# Patient Record
Sex: MALE | Race: BLACK OR AFRICAN AMERICAN | NOT HISPANIC OR LATINO | Employment: OTHER | URBAN - METROPOLITAN AREA
[De-identification: names, ages, dates, MRNs, and addresses within clinical notes are randomized per-mention and may not be internally consistent; named-entity substitution may affect disease eponyms.]

---

## 2022-07-26 ENCOUNTER — OFFICE VISIT (OUTPATIENT)
Dept: PODIATRY | Facility: CLINIC | Age: 72
End: 2022-07-26
Payer: MEDICARE

## 2022-07-26 VITALS
WEIGHT: 214 LBS | HEART RATE: 89 BPM | BODY MASS INDEX: 31.7 KG/M2 | SYSTOLIC BLOOD PRESSURE: 148 MMHG | TEMPERATURE: 98 F | DIASTOLIC BLOOD PRESSURE: 86 MMHG | HEIGHT: 69 IN

## 2022-07-26 DIAGNOSIS — L60.0 INGROWN NAIL: ICD-10-CM

## 2022-07-26 DIAGNOSIS — M20.41 HAMMER TOES OF BOTH FEET: ICD-10-CM

## 2022-07-26 DIAGNOSIS — M20.12 VALGUS DEFORMITY OF BOTH GREAT TOES: ICD-10-CM

## 2022-07-26 DIAGNOSIS — E11.9 TYPE 2 DIABETES MELLITUS WITHOUT COMPLICATION, WITHOUT LONG-TERM CURRENT USE OF INSULIN: Primary | ICD-10-CM

## 2022-07-26 DIAGNOSIS — E11.9 COMPREHENSIVE DIABETIC FOOT EXAMINATION, TYPE 2 DM, ENCOUNTER FOR: ICD-10-CM

## 2022-07-26 DIAGNOSIS — M20.42 HAMMER TOES OF BOTH FEET: ICD-10-CM

## 2022-07-26 DIAGNOSIS — M20.11 VALGUS DEFORMITY OF BOTH GREAT TOES: ICD-10-CM

## 2022-07-26 PROCEDURE — 99999 PR PBB SHADOW E&M-NEW PATIENT-LVL IV: ICD-10-PCS | Mod: PBBFAC,,, | Performed by: PODIATRIST

## 2022-07-26 PROCEDURE — 99203 OFFICE O/P NEW LOW 30 MIN: CPT | Mod: S$PBB,,, | Performed by: PODIATRIST

## 2022-07-26 PROCEDURE — 99204 OFFICE O/P NEW MOD 45 MIN: CPT | Mod: PBBFAC,PN | Performed by: PODIATRIST

## 2022-07-26 PROCEDURE — 99203 PR OFFICE/OUTPT VISIT, NEW, LEVL III, 30-44 MIN: ICD-10-PCS | Mod: S$PBB,,, | Performed by: PODIATRIST

## 2022-07-26 PROCEDURE — 99999 PR PBB SHADOW E&M-NEW PATIENT-LVL IV: CPT | Mod: PBBFAC,,, | Performed by: PODIATRIST

## 2022-07-26 RX ORDER — METFORMIN HYDROCHLORIDE 500 MG/1
500 TABLET ORAL 2 TIMES DAILY WITH MEALS
COMMUNITY

## 2022-07-26 RX ORDER — OXYBUTYNIN CHLORIDE 5 MG/1
5 TABLET ORAL 3 TIMES DAILY
COMMUNITY

## 2022-07-26 RX ORDER — LISINOPRIL 10 MG/1
10 TABLET ORAL DAILY
COMMUNITY

## 2022-07-26 RX ORDER — ASPIRIN 81 MG/1
81 TABLET ORAL DAILY
COMMUNITY

## 2022-07-26 RX ORDER — AMLODIPINE BESYLATE 10 MG/1
10 TABLET ORAL DAILY
COMMUNITY

## 2022-07-26 RX ORDER — PANTOPRAZOLE SODIUM 20 MG/1
20 TABLET, DELAYED RELEASE ORAL DAILY
COMMUNITY

## 2022-07-26 RX ORDER — LEVETIRACETAM 750 MG/1
500 TABLET ORAL 2 TIMES DAILY
COMMUNITY

## 2022-07-26 RX ORDER — LEVETIRACETAM 500 MG/1
500 TABLET ORAL 2 TIMES DAILY
COMMUNITY
End: 2023-09-06

## 2022-07-26 RX ORDER — FERROUS SULFATE 325(65) MG
325 TABLET ORAL
COMMUNITY

## 2022-07-26 RX ORDER — TRAZODONE HYDROCHLORIDE 50 MG/1
50 TABLET ORAL NIGHTLY
COMMUNITY
End: 2023-01-29

## 2022-07-26 RX ORDER — POLYETHYLENE GLYCOL 3350 17 G/17G
POWDER, FOR SOLUTION ORAL
COMMUNITY

## 2022-07-30 PROBLEM — M20.41 HAMMER TOES OF BOTH FEET: Status: ACTIVE | Noted: 2022-07-30

## 2022-07-30 PROBLEM — E11.9 COMPREHENSIVE DIABETIC FOOT EXAMINATION, TYPE 2 DM, ENCOUNTER FOR: Status: ACTIVE | Noted: 2022-07-30

## 2022-07-30 PROBLEM — E11.9 TYPE 2 DIABETES MELLITUS WITHOUT COMPLICATION, WITHOUT LONG-TERM CURRENT USE OF INSULIN: Status: ACTIVE | Noted: 2022-07-30

## 2022-07-30 PROBLEM — L60.0 INGROWN NAIL: Status: ACTIVE | Noted: 2022-07-30

## 2022-07-30 PROBLEM — M20.11 VALGUS DEFORMITY OF BOTH GREAT TOES: Status: ACTIVE | Noted: 2022-07-30

## 2022-07-30 PROBLEM — M20.42 HAMMER TOES OF BOTH FEET: Status: ACTIVE | Noted: 2022-07-30

## 2022-07-30 PROBLEM — M20.12 VALGUS DEFORMITY OF BOTH GREAT TOES: Status: ACTIVE | Noted: 2022-07-30

## 2022-07-30 NOTE — PROGRESS NOTES
Subjective:       Patient ID: Gerald Tirado is a 71 y.o. male.    Chief Complaint: Nail Problem and Diabetes Mellitus    Patient presents today for a new patient diabetic evaluation he is a resident of [facility].  Patient states he does have pain related to his bunions and he also has ingrown toenails he relates that he was diagnosed as a diabetic 2 years ago.  Past Medical History:   Diagnosis Date    Antisocial personality disorder     Cerebrovascular disease     Complete loss of teeth     Constipation     Convulsions     Dementia     Diabetes mellitus, type 2     Falling     GERD (gastroesophageal reflux disease)     Gingival recession, generalized     Hypertension     Insomnia     Overactive bladder     Schizophrenia      History reviewed. No pertinent surgical history.  History reviewed. No pertinent family history.  Social History     Socioeconomic History    Marital status: Single   Tobacco Use    Smoking status: Never Smoker    Smokeless tobacco: Never Used       Current Outpatient Medications   Medication Sig Dispense Refill    amLODIPine (NORVASC) 10 MG tablet Take 10 mg by mouth once daily.      aspirin (ECOTRIN) 81 MG EC tablet Take 81 mg by mouth once daily.      ferrous sulfate (FEOSOL) 325 mg (65 mg iron) Tab tablet Take 325 mg by mouth daily with breakfast.      levETIRAcetam (KEPPRA) 500 MG Tab Take 500 mg by mouth 2 (two) times daily.      levETIRAcetam (KEPPRA) 750 MG Tab Take 500 mg by mouth 2 (two) times daily.      lisinopriL 10 MG tablet Take 10 mg by mouth once daily.      metFORMIN (GLUCOPHAGE) 500 MG tablet Take 500 mg by mouth 2 (two) times daily with meals.      oxybutynin (DITROPAN) 5 MG Tab Take 5 mg by mouth 3 (three) times daily.      pantoprazole (PROTONIX) 20 MG tablet Take 20 mg by mouth once daily.      polyethylene glycol (GLYCOLAX) 17 gram PwPk Take by mouth.      traZODone (DESYREL) 50 MG tablet Take 50 mg by mouth every evening.   "     No current facility-administered medications for this visit.     Review of patient's allergies indicates:   Allergen Reactions    Baclofen        Review of Systems   Musculoskeletal: Positive for arthralgias.   Skin: Positive for color change.   Neurological: Positive for numbness.   All other systems reviewed and are negative.      Objective:      Vitals:    07/26/22 1549   BP: (!) 148/86   Pulse: 89   Temp: 97.9 °F (36.6 °C)   Weight: 97.1 kg (214 lb)   Height: 5' 9" (1.753 m)     Physical Exam  Vitals and nursing note reviewed. Exam conducted with a chaperone present.   Constitutional:       Appearance: Normal appearance.   Cardiovascular:      Pulses:           Dorsalis pedis pulses are 1+ on the right side and 1+ on the left side.        Posterior tibial pulses are 1+ on the right side and 1+ on the left side.   Pulmonary:      Effort: Pulmonary effort is normal.   Musculoskeletal:         General: Swelling, tenderness and deformity present.      Right foot: Decreased range of motion. Deformity and bunion present.      Left foot: Decreased range of motion. Deformity and bunion present.   Feet:      Right foot:      Protective Sensation: 3 sites tested. 2 sites sensed.      Skin integrity: Erythema, callus and dry skin present.      Toenail Condition: Right toenails are abnormally thick, long and ingrown. Fungal disease present.     Left foot:      Protective Sensation: 3 sites tested. 2 sites sensed.      Skin integrity: Erythema, callus and dry skin present.      Toenail Condition: Left toenails are abnormally thick, long and ingrown. Fungal disease present.  Skin:     General: Skin is warm.      Capillary Refill: Capillary refill takes 2 to 3 seconds.      Findings: Erythema present.   Neurological:      General: No focal deficit present.      Mental Status: He is alert.   Psychiatric:         Mood and Affect: Mood normal.                              Assessment:       1. Type 2 diabetes mellitus " without complication, without long-term current use of insulin    2. Comprehensive diabetic foot examination, type 2 DM, encounter for    3. Hammer toes of both feet    4. Valgus deformity of both great toes    5. Ingrown nail        Plan:       Patient presents today for a new patient diabetic evaluation he is a resident of [facility].  Patient states he does have pain related to his bunions and he also has ingrown toenails he relates that he was diagnosed as a diabetic 2 years ago.  Patient presented today with a chaperone from the nursing home.  A comprehensive new patient diabetic evaluation was performed patient does have some degree of loss of sensation although protective sensation is noted to be intact when tested with a 5.07 monofilament.  Patient does have significant bunion and hammertoe deformities bilateral he does have pain related to these deformities and states he has to be very careful of the shoes that he wears.  Patient has significantly fungally infected nail several are ingrown and pinching the surrounding skin causing inflammation and early signs of ingrowing toenail.  Patient has no skin breaks no ulceration or signs of infection but there is inflammation related to ingrowing toenail I was able to trim and remove the ingrowing toenails patient did not require a nail avulsion at this time.  Comprehensive diabetic evaluation was performed today I advised the patient he needs to monitor his feet closely keep them well hydrated and he needs to keep the nails properly trimmed to prevent complications in the future.  Diabetic education was provided.  This note was created using [employer] voice recognition software that occasionally misinterpreted phrases or words.

## 2023-01-26 ENCOUNTER — OFFICE VISIT (OUTPATIENT)
Dept: PODIATRY | Facility: CLINIC | Age: 73
End: 2023-01-26
Payer: MEDICARE

## 2023-01-26 VITALS
DIASTOLIC BLOOD PRESSURE: 79 MMHG | SYSTOLIC BLOOD PRESSURE: 137 MMHG | WEIGHT: 214 LBS | BODY MASS INDEX: 31.7 KG/M2 | HEART RATE: 101 BPM | HEIGHT: 69 IN

## 2023-01-26 DIAGNOSIS — M20.41 HAMMER TOES OF BOTH FEET: ICD-10-CM

## 2023-01-26 DIAGNOSIS — M20.12 VALGUS DEFORMITY OF BOTH GREAT TOES: ICD-10-CM

## 2023-01-26 DIAGNOSIS — M20.11 VALGUS DEFORMITY OF BOTH GREAT TOES: ICD-10-CM

## 2023-01-26 DIAGNOSIS — L60.0 INGROWN NAIL: Primary | ICD-10-CM

## 2023-01-26 DIAGNOSIS — M20.42 HAMMER TOES OF BOTH FEET: ICD-10-CM

## 2023-01-26 DIAGNOSIS — E11.9 COMPREHENSIVE DIABETIC FOOT EXAMINATION, TYPE 2 DM, ENCOUNTER FOR: ICD-10-CM

## 2023-01-26 DIAGNOSIS — E11.9 TYPE 2 DIABETES MELLITUS WITHOUT COMPLICATION, WITHOUT LONG-TERM CURRENT USE OF INSULIN: ICD-10-CM

## 2023-01-26 PROCEDURE — 99214 OFFICE O/P EST MOD 30 MIN: CPT | Mod: PBBFAC,PN | Performed by: PODIATRIST

## 2023-01-26 PROCEDURE — 99213 PR OFFICE/OUTPT VISIT, EST, LEVL III, 20-29 MIN: ICD-10-PCS | Mod: S$PBB,,, | Performed by: PODIATRIST

## 2023-01-26 PROCEDURE — 99213 OFFICE O/P EST LOW 20 MIN: CPT | Mod: S$PBB,,, | Performed by: PODIATRIST

## 2023-01-26 PROCEDURE — 99999 PR PBB SHADOW E&M-EST. PATIENT-LVL IV: ICD-10-PCS | Mod: PBBFAC,,, | Performed by: PODIATRIST

## 2023-01-26 PROCEDURE — 99999 PR PBB SHADOW E&M-EST. PATIENT-LVL IV: CPT | Mod: PBBFAC,,, | Performed by: PODIATRIST

## 2023-01-26 RX ORDER — LYSINE HCL 500 MG
1 TABLET ORAL 2 TIMES DAILY
COMMUNITY

## 2023-01-26 RX ORDER — POTASSIUM CHLORIDE 750 MG/1
10 CAPSULE, EXTENDED RELEASE ORAL ONCE
COMMUNITY
End: 2023-01-29

## 2023-01-26 RX ORDER — ATORVASTATIN CALCIUM 20 MG/1
20 TABLET, FILM COATED ORAL DAILY
COMMUNITY

## 2023-01-26 RX ORDER — FUROSEMIDE 20 MG/1
20 TABLET ORAL DAILY
COMMUNITY

## 2023-01-26 RX ORDER — DOCUSATE SODIUM 100 MG/1
100 CAPSULE, LIQUID FILLED ORAL 2 TIMES DAILY
COMMUNITY

## 2023-01-26 RX ORDER — CYCLOBENZAPRINE HCL 10 MG
10 TABLET ORAL 2 TIMES DAILY
COMMUNITY

## 2023-01-29 NOTE — PROGRESS NOTES
Subjective:       Patient ID: Gerald Tirado is a 72 y.o. male.    Chief Complaint: Diabetes Mellitus, Nail Problem, Nail Care, Diabetic Foot Exam, and Follow-up    Patient presents today for a follow-up patient diabetic evaluation he is a resident of [facility].  Patient states he does have pain related to his bunions and he also has ingrown toenails he relates that he was diagnosed as a diabetic 2 years ago.  Past Medical History:   Diagnosis Date    Antisocial personality disorder     Cataract     Cerebrovascular disease     Complete loss of teeth     Constipation     Convulsions     Dementia     Diabetes mellitus, type 2     Falling     GERD (gastroesophageal reflux disease)     Gingival recession, generalized     Hyperlipidemia     Hypertension     Insomnia     Myopia, bilateral     Overactive bladder     Presbyopia     Schizophrenia     Unspecified astigmatism, unspecified eye      No past surgical history on file.  No family history on file.  Social History     Socioeconomic History    Marital status: Single   Tobacco Use    Smoking status: Never    Smokeless tobacco: Never       Current Outpatient Medications   Medication Sig Dispense Refill    amLODIPine (NORVASC) 10 MG tablet Take 10 mg by mouth once daily.      aspirin (ECOTRIN) 81 MG EC tablet Take 81 mg by mouth once daily.      atorvastatin (LIPITOR) 20 MG tablet Take 20 mg by mouth once daily.      calcium carbonate-vit D3-min 600 mg calcium- 400 unit Tab Take 1 tablet by mouth 2 (two) times daily.      cyclobenzaprine (FLEXERIL) 10 MG tablet Take 10 mg by mouth 2 (two) times a day.      docusate sodium (COLACE) 100 MG capsule Take 100 mg by mouth 2 (two) times daily.      ferrous sulfate (FEOSOL) 325 mg (65 mg iron) Tab tablet Take 325 mg by mouth daily with breakfast.      furosemide (LASIX) 20 MG tablet Take 20 mg by mouth once daily.      insulin detemir U-100 (LEVEMIR) 100 unit/mL injection Inject 15 Units into the skin every  "evening.      levETIRAcetam (KEPPRA) 750 MG Tab Take 500 mg by mouth 2 (two) times daily.      lisinopriL 10 MG tablet Take 10 mg by mouth once daily.      metFORMIN (GLUCOPHAGE) 500 MG tablet Take 500 mg by mouth 2 (two) times daily with meals.      oxybutynin (DITROPAN) 5 MG Tab Take 5 mg by mouth 3 (three) times daily.      pantoprazole (PROTONIX) 20 MG tablet Take 20 mg by mouth once daily.      polyethylene glycol (GLYCOLAX) 17 gram PwPk Take by mouth.      levETIRAcetam (KEPPRA) 500 MG Tab Take 500 mg by mouth 2 (two) times daily.       No current facility-administered medications for this visit.     Review of patient's allergies indicates:   Allergen Reactions    Baclofen        Review of Systems   Musculoskeletal:  Positive for arthralgias.   Skin:  Positive for color change.   Neurological:  Positive for numbness.   All other systems reviewed and are negative.    Objective:      Vitals:    01/26/23 1408   BP: 137/79   Pulse: 101   Weight: 97.1 kg (214 lb)   Height: 5' 9" (1.753 m)     Physical Exam  Vitals and nursing note reviewed. Exam conducted with a chaperone present.   Constitutional:       Appearance: Normal appearance.   Cardiovascular:      Pulses:           Dorsalis pedis pulses are 1+ on the right side and 1+ on the left side.        Posterior tibial pulses are 1+ on the right side and 1+ on the left side.   Pulmonary:      Effort: Pulmonary effort is normal.   Musculoskeletal:         General: Swelling, tenderness and deformity present.      Right foot: Decreased range of motion. Deformity and bunion present.      Left foot: Decreased range of motion. Deformity and bunion present.   Feet:      Right foot:      Protective Sensation: 3 sites tested.  2 sites sensed.      Skin integrity: Erythema, callus and dry skin present.      Toenail Condition: Right toenails are abnormally thick, long and ingrown. Fungal disease present.     Left foot:      Protective Sensation: 3 sites tested.  2 sites " sensed.      Skin integrity: Erythema, callus and dry skin present.      Toenail Condition: Left toenails are abnormally thick, long and ingrown. Fungal disease present.  Skin:     General: Skin is warm.      Capillary Refill: Capillary refill takes 2 to 3 seconds.      Findings: Erythema present.   Neurological:      General: No focal deficit present.      Mental Status: He is alert.   Psychiatric:         Mood and Affect: Mood normal.                                          Assessment:       1. Ingrown nail    2. Type 2 diabetes mellitus without complication, without long-term current use of insulin    3. Comprehensive diabetic foot examination, type 2 DM, encounter for    4. Valgus deformity of both great toes    5. Hammer toes of both feet        Plan:       Patient presents today for a follow-up patient diabetic evaluation he is a resident of [facility].  Patient states he does have pain related to his bunions and he also has ingrown toenails he relates that he was diagnosed as a diabetic 2 years ago.  Patient presented today with a chaperone from the nursing home.  A comprehensive new patient diabetic evaluation was performed patient does have some degree of loss of sensation although protective sensation is noted to be intact when tested with a 5.07 monofilament.  Patient does have significant bunion and hammertoe deformities bilateral he does have pain related to these deformities and states he has to be very careful of the shoes that he wears.  Patient has significantly fungally infected nail several are ingrown and pinching the surrounding skin causing inflammation and early signs of ingrowing toenail.  Patient has no skin breaks no ulceration or signs of infection but there is inflammation related to ingrowing toenail I was able to trim and remove the ingrowing toenails patient did not require a nail avulsion at this time.  Comprehensive diabetic evaluation was performed today I  advised the patient he needs to monitor his feet closely keep them well hydrated and he needs to keep the nails properly trimmed to prevent complications in the future.  Diabetic education was provided.  This note was created using [employer] voice recognition software that occasionally misinterpreted phrases or words.

## 2023-05-16 ENCOUNTER — OFFICE VISIT (OUTPATIENT)
Dept: PODIATRY | Facility: CLINIC | Age: 73
End: 2023-05-16
Payer: MEDICARE

## 2023-05-16 VITALS
HEART RATE: 80 BPM | WEIGHT: 221.19 LBS | HEIGHT: 69 IN | BODY MASS INDEX: 32.76 KG/M2 | SYSTOLIC BLOOD PRESSURE: 145 MMHG | DIASTOLIC BLOOD PRESSURE: 73 MMHG

## 2023-05-16 DIAGNOSIS — M20.41 HAMMER TOES OF BOTH FEET: ICD-10-CM

## 2023-05-16 DIAGNOSIS — E11.9 TYPE 2 DIABETES MELLITUS WITHOUT COMPLICATION, WITHOUT LONG-TERM CURRENT USE OF INSULIN: ICD-10-CM

## 2023-05-16 DIAGNOSIS — L60.0 INGROWN NAIL: Primary | ICD-10-CM

## 2023-05-16 DIAGNOSIS — M20.42 HAMMER TOES OF BOTH FEET: ICD-10-CM

## 2023-05-16 DIAGNOSIS — B35.3 TINEA PEDIS OF BOTH FEET: ICD-10-CM

## 2023-05-16 DIAGNOSIS — E11.9 COMPREHENSIVE DIABETIC FOOT EXAMINATION, TYPE 2 DM, ENCOUNTER FOR: ICD-10-CM

## 2023-05-16 PROCEDURE — 99213 OFFICE O/P EST LOW 20 MIN: CPT | Mod: S$PBB,,, | Performed by: PODIATRIST

## 2023-05-16 PROCEDURE — 99215 OFFICE O/P EST HI 40 MIN: CPT | Mod: PBBFAC,PN | Performed by: PODIATRIST

## 2023-05-16 PROCEDURE — 99213 PR OFFICE/OUTPT VISIT, EST, LEVL III, 20-29 MIN: ICD-10-PCS | Mod: S$PBB,,, | Performed by: PODIATRIST

## 2023-05-16 PROCEDURE — 99999 PR PBB SHADOW E&M-EST. PATIENT-LVL V: CPT | Mod: PBBFAC,,, | Performed by: PODIATRIST

## 2023-05-16 PROCEDURE — 99999 PR PBB SHADOW E&M-EST. PATIENT-LVL V: ICD-10-PCS | Mod: PBBFAC,,, | Performed by: PODIATRIST

## 2023-05-16 RX ORDER — ACETAMINOPHEN 325 MG/1
325 TABLET ORAL EVERY 6 HOURS PRN
COMMUNITY

## 2023-05-16 RX ORDER — DICLOFENAC SODIUM 10 MG/G
GEL TOPICAL
COMMUNITY
Start: 2022-11-18

## 2023-05-16 RX ORDER — BACITRACIN ZINC AND POLYMYXIN B SULFATE 500; 10000 [USP'U]/G; [USP'U]/G
OINTMENT OPHTHALMIC
COMMUNITY
Start: 2023-05-11

## 2023-05-16 RX ORDER — POTASSIUM CHLORIDE 750 MG/1
CAPSULE, EXTENDED RELEASE ORAL 2 TIMES DAILY
COMMUNITY
Start: 2023-04-28

## 2023-05-16 NOTE — PROGRESS NOTES
Subjective:       Patient ID: Gerald Tirado is a 72 y.o. male.    Chief Complaint: Ingrown Toenail    Patient presents today for a follow-up patient diabetic evaluation he is a resident of [facility].  Patient states he does have pain related to his bunions and he also has ingrown toenails he relates that he was diagnosed as a diabetic 2 years ago.  Past Medical History:   Diagnosis Date    Antisocial personality disorder     Cataract     Cerebrovascular disease     Complete loss of teeth     Constipation     Convulsions     Dementia     Diabetes mellitus, type 2     Falling     GERD (gastroesophageal reflux disease)     Gingival recession, generalized     Hyperlipidemia     Hypertension     Insomnia     Myopia, bilateral     Overactive bladder     Presbyopia     Schizophrenia     Unspecified astigmatism, unspecified eye      History reviewed. No pertinent surgical history.  History reviewed. No pertinent family history.  Social History     Socioeconomic History    Marital status: Single   Tobacco Use    Smoking status: Never    Smokeless tobacco: Never       Current Outpatient Medications   Medication Sig Dispense Refill    acetaminophen (TYLENOL) 325 MG tablet Take 325 mg by mouth every 6 (six) hours as needed for Pain.      amLODIPine (NORVASC) 10 MG tablet Take 10 mg by mouth once daily.      aspirin (ECOTRIN) 81 MG EC tablet Take 81 mg by mouth once daily.      atorvastatin (LIPITOR) 20 MG tablet Take 20 mg by mouth once daily.      bacitracin-neomycin-polymyxin b-hydrocortisone 1 % ointment Apply topically once daily.      calcium carbonate-vit D3-min 600 mg calcium- 400 unit Tab Take 1 tablet by mouth 2 (two) times daily.      cyclobenzaprine (FLEXERIL) 10 MG tablet Take 10 mg by mouth 2 (two) times a day.      docusate sodium (COLACE) 100 MG capsule Take 100 mg by mouth 2 (two) times daily.      ferrous sulfate (FEOSOL) 325 mg (65 mg iron) Tab tablet Take 325 mg by mouth daily with  "breakfast.      furosemide (LASIX) 20 MG tablet Take 20 mg by mouth once daily.      insulin detemir U-100 (LEVEMIR) 100 unit/mL injection Inject 15 Units into the skin every evening.      levETIRAcetam (KEPPRA) 750 MG Tab Take 500 mg by mouth 2 (two) times daily.      lisinopriL 10 MG tablet Take 10 mg by mouth once daily.      metFORMIN (GLUCOPHAGE) 500 MG tablet Take 500 mg by mouth 2 (two) times daily with meals.      oxybutynin (DITROPAN) 5 MG Tab Take 5 mg by mouth 3 (three) times daily.      pantoprazole (PROTONIX) 20 MG tablet Take 20 mg by mouth once daily.      polyethylene glycol (GLYCOLAX) 17 gram PwPk Take by mouth.      potassium chloride (MICRO-K) 10 MEQ CpSR Take by mouth 2 (two) times daily.      bacitracin-polymyxin b (POLYSPORIN) ophthalmic ointment       diclofenac sodium (VOLTAREN) 1 % Gel Apply topically.      levETIRAcetam (KEPPRA) 500 MG Tab Take 500 mg by mouth 2 (two) times daily.       No current facility-administered medications for this visit.     Review of patient's allergies indicates:   Allergen Reactions    Baclofen        Review of Systems   Musculoskeletal:  Positive for arthralgias.   Skin:  Positive for color change.   Neurological:  Positive for numbness.   All other systems reviewed and are negative.    Objective:      Vitals:    05/16/23 0956   BP: (!) 145/73   Pulse: 80   Weight: 100.3 kg (221 lb 3.2 oz)   Height: 5' 9" (1.753 m)     Physical Exam  Vitals and nursing note reviewed. Exam conducted with a chaperone present.   Constitutional:       Appearance: Normal appearance.   Cardiovascular:      Pulses:           Dorsalis pedis pulses are 1+ on the right side and 1+ on the left side.        Posterior tibial pulses are 1+ on the right side and 1+ on the left side.   Pulmonary:      Effort: Pulmonary effort is normal.   Musculoskeletal:         General: Swelling, tenderness and deformity present.      Right foot: Decreased range of motion. Deformity and bunion present.      " Left foot: Decreased range of motion. Deformity and bunion present.   Feet:      Right foot:      Protective Sensation: 3 sites tested.  2 sites sensed.      Skin integrity: Erythema, callus and dry skin present.      Toenail Condition: Right toenails are abnormally thick, long and ingrown. Fungal disease present.     Left foot:      Protective Sensation: 3 sites tested.  2 sites sensed.      Skin integrity: Erythema, callus and dry skin present.      Toenail Condition: Left toenails are abnormally thick, long and ingrown. Fungal disease present.  Skin:     General: Skin is warm.      Capillary Refill: Capillary refill takes 2 to 3 seconds.      Findings: Erythema present.   Neurological:      General: No focal deficit present.      Mental Status: He is alert.   Psychiatric:         Mood and Affect: Mood normal.                                                                                    Assessment:       1. Ingrown nail    2. Type 2 diabetes mellitus without complication, without long-term current use of insulin    3. Comprehensive diabetic foot examination, type 2 DM, encounter for    4. Hammer toes of both feet    5. Tinea pedis of both feet        Plan:       Patient presents today for a follow-up patient diabetic evaluation he is a resident of [facility].  Patient states he does have pain related to his bunions and he also has ingrown toenails he relates that he was diagnosed as a diabetic 2 years ago.  Patient presented today with a chaperone from the nursing home.  A comprehensive new patient diabetic evaluation was performed patient does have some degree of loss of sensation although protective sensation is noted to be intact when tested with a 5.07 monofilament.  Patient does have significant bunion and hammertoe deformities bilateral he does have pain related to these deformities and states he has to be very careful of the shoes that he wears.  Patient has significantly fungally  infected nail several are ingrown and pinching the surrounding skin causing inflammation and early signs of ingrowing toenail.  Patient has no skin breaks no ulceration or signs of infection but there is inflammation related to ingrowing toenail I was able to trim and remove the ingrowing toenails patient did not require a nail avulsion at this time.  Comprehensive diabetic evaluation was performed today I advised the patient he needs to monitor his feet closely keep them well hydrated and he needs to keep the nails properly trimmed to prevent complications in the future.  Diabetic education was provided.  Patient did have interdigital maceration Betadine was applied to all webspaces bilateral I also dispensed the patient various size Silipos toe spacers to place between the 1st and 2nd and 2nd and 3rd digits on the right foot where he is got a lot of rubbing and related discomfort.  This note was created using [employer] voice recognition software that occasionally misinterpreted phrases or words.

## 2023-07-10 ENCOUNTER — TELEPHONE (OUTPATIENT)
Dept: PODIATRY | Facility: CLINIC | Age: 73
End: 2023-07-10
Payer: MEDICARE

## 2023-09-05 ENCOUNTER — OFFICE VISIT (OUTPATIENT)
Dept: PODIATRY | Facility: CLINIC | Age: 73
End: 2023-09-05
Payer: MEDICARE

## 2023-09-05 VITALS
HEART RATE: 77 BPM | WEIGHT: 221 LBS | DIASTOLIC BLOOD PRESSURE: 68 MMHG | BODY MASS INDEX: 32.73 KG/M2 | HEIGHT: 69 IN | SYSTOLIC BLOOD PRESSURE: 143 MMHG

## 2023-09-05 DIAGNOSIS — E11.9 TYPE 2 DIABETES MELLITUS WITHOUT COMPLICATION, WITHOUT LONG-TERM CURRENT USE OF INSULIN: ICD-10-CM

## 2023-09-05 DIAGNOSIS — M20.41 HAMMER TOES OF BOTH FEET: ICD-10-CM

## 2023-09-05 DIAGNOSIS — M20.12 VALGUS DEFORMITY OF BOTH GREAT TOES: ICD-10-CM

## 2023-09-05 DIAGNOSIS — L60.0 INGROWN NAIL: Primary | ICD-10-CM

## 2023-09-05 DIAGNOSIS — M20.42 HAMMER TOES OF BOTH FEET: ICD-10-CM

## 2023-09-05 DIAGNOSIS — M20.11 VALGUS DEFORMITY OF BOTH GREAT TOES: ICD-10-CM

## 2023-09-05 DIAGNOSIS — E11.9 COMPREHENSIVE DIABETIC FOOT EXAMINATION, TYPE 2 DM, ENCOUNTER FOR: ICD-10-CM

## 2023-09-05 PROCEDURE — 99213 OFFICE O/P EST LOW 20 MIN: CPT | Mod: S$PBB,,, | Performed by: PODIATRIST

## 2023-09-05 PROCEDURE — 99215 OFFICE O/P EST HI 40 MIN: CPT | Mod: PBBFAC,PN | Performed by: PODIATRIST

## 2023-09-05 PROCEDURE — 99213 PR OFFICE/OUTPT VISIT, EST, LEVL III, 20-29 MIN: ICD-10-PCS | Mod: S$PBB,,, | Performed by: PODIATRIST

## 2023-09-05 PROCEDURE — 99999 PR PBB SHADOW E&M-EST. PATIENT-LVL V: CPT | Mod: PBBFAC,,, | Performed by: PODIATRIST

## 2023-09-05 PROCEDURE — 99999 PR PBB SHADOW E&M-EST. PATIENT-LVL V: ICD-10-PCS | Mod: PBBFAC,,, | Performed by: PODIATRIST

## 2023-09-06 NOTE — PROGRESS NOTES
Subjective:       Patient ID: Gerald Tirado is a 72 y.o. male.    Chief Complaint: Ingrown Toenail and Diabetic Foot Exam    Patient presents today for a follow-up patient diabetic evaluation he is a resident of [facility].  Patient states he does have pain related to his bunions and he also has ingrown toenails he relates that he was diagnosed as a diabetic 2 years ago.  Past Medical History:   Diagnosis Date    Antisocial personality disorder     Cataract     Cerebrovascular disease     Complete loss of teeth     Constipation     Convulsions     Dementia     Diabetes mellitus, type 2     Falling     GERD (gastroesophageal reflux disease)     Gingival recession, generalized     Hyperlipidemia     Hypertension     Insomnia     Myopia, bilateral     Overactive bladder     Presbyopia     Schizophrenia     Unspecified astigmatism, unspecified eye      History reviewed. No pertinent surgical history.  History reviewed. No pertinent family history.  Social History     Socioeconomic History    Marital status: Single   Tobacco Use    Smoking status: Never    Smokeless tobacco: Never       Current Outpatient Medications   Medication Sig Dispense Refill    acetaminophen (TYLENOL) 325 MG tablet Take 325 mg by mouth every 6 (six) hours as needed for Pain.      amLODIPine (NORVASC) 10 MG tablet Take 10 mg by mouth once daily.      aspirin (ECOTRIN) 81 MG EC tablet Take 81 mg by mouth once daily.      atorvastatin (LIPITOR) 20 MG tablet Take 20 mg by mouth once daily.      calcium carbonate-vit D3-min 600 mg calcium- 400 unit Tab Take 1 tablet by mouth 2 (two) times daily.      cyclobenzaprine (FLEXERIL) 10 MG tablet Take 10 mg by mouth 2 (two) times a day.      docusate sodium (COLACE) 100 MG capsule Take 100 mg by mouth 2 (two) times daily.      ferrous sulfate (FEOSOL) 325 mg (65 mg iron) Tab tablet Take 325 mg by mouth daily with breakfast.      furosemide (LASIX) 20 MG tablet Take 20 mg by mouth once  "daily.      insulin detemir U-100 (LEVEMIR) 100 unit/mL injection Inject 15 Units into the skin every evening.      levETIRAcetam (KEPPRA) 750 MG Tab Take 500 mg by mouth 2 (two) times daily.      lisinopriL 10 MG tablet Take 10 mg by mouth once daily.      metFORMIN (GLUCOPHAGE) 500 MG tablet Take 500 mg by mouth 2 (two) times daily with meals.      oxybutynin (DITROPAN) 5 MG Tab Take 5 mg by mouth 3 (three) times daily.      pantoprazole (PROTONIX) 20 MG tablet Take 20 mg by mouth once daily.      polyethylene glycol (GLYCOLAX) 17 gram PwPk Take by mouth.      potassium chloride (MICRO-K) 10 MEQ CpSR Take by mouth 2 (two) times daily.      bacitracin-neomycin-polymyxin b-hydrocortisone 1 % ointment Apply topically once daily.      bacitracin-polymyxin b (POLYSPORIN) ophthalmic ointment       diclofenac sodium (VOLTAREN) 1 % Gel Apply topically.       No current facility-administered medications for this visit.     Review of patient's allergies indicates:   Allergen Reactions    Baclofen        Review of Systems   Musculoskeletal:  Positive for arthralgias.   Skin:  Positive for color change.   Neurological:  Positive for numbness.   All other systems reviewed and are negative.      Objective:      Vitals:    09/05/23 1049   BP: (!) 143/68   BP Location: Right arm   Patient Position: Sitting   Pulse: 77   Weight: 100.2 kg (221 lb)   Height: 5' 9" (1.753 m)     Physical Exam  Vitals and nursing note reviewed. Exam conducted with a chaperone present.   Constitutional:       Appearance: Normal appearance.   Cardiovascular:      Pulses:           Dorsalis pedis pulses are 1+ on the right side and 1+ on the left side.        Posterior tibial pulses are 1+ on the right side and 1+ on the left side.   Pulmonary:      Effort: Pulmonary effort is normal.   Musculoskeletal:         General: Swelling, tenderness and deformity present.      Right foot: Decreased range of motion. Deformity and bunion present.      Left foot: " Decreased range of motion. Deformity and bunion present.   Feet:      Right foot:      Protective Sensation: 3 sites tested.  2 sites sensed.      Skin integrity: Erythema, callus and dry skin present.      Toenail Condition: Right toenails are abnormally thick, long and ingrown. Fungal disease present.     Left foot:      Protective Sensation: 3 sites tested.  2 sites sensed.      Skin integrity: Erythema, callus and dry skin present.      Toenail Condition: Left toenails are abnormally thick, long and ingrown. Fungal disease present.  Skin:     General: Skin is warm.      Capillary Refill: Capillary refill takes 2 to 3 seconds.      Findings: Erythema present.   Neurological:      General: No focal deficit present.      Mental Status: He is alert.   Psychiatric:         Mood and Affect: Mood normal.                                  Assessment:       1. Ingrown nail    2. Type 2 diabetes mellitus without complication, without long-term current use of insulin    3. Comprehensive diabetic foot examination, type 2 DM, encounter for    4. Hammer toes of both feet    5. Valgus deformity of both great toes        Plan:       Patient presents today for a follow-up patient diabetic evaluation he is a resident of [facility].  Patient states he does have pain related to his bunions and he also has ingrown toenails he relates that he was diagnosed as a diabetic 2 years ago.  Patient presented today with a chaperone from the nursing home.  A comprehensive new patient diabetic evaluation was performed patient does have some degree of loss of sensation although protective sensation is noted to be intact when tested with a 5.07 monofilament.  Patient does have significant bunion and hammertoe deformities bilateral he does have pain related to these deformities and states he has to be very careful of the shoes that he wears.  Patient has significantly fungally infected nail several are ingrown and pinching the  surrounding skin causing inflammation and early signs of ingrowing toenail.  Patient has no skin breaks no ulceration or signs of infection but there is inflammation related to ingrowing toenail I was able to trim and remove the ingrowing toenails patient did not require a nail avulsion at this time.  Comprehensive diabetic evaluation was performed today I advised the patient he needs to monitor his feet closely keep them well hydrated and he needs to keep the nails properly trimmed to prevent complications in the future.  Diabetic education was provided.  Patient did have interdigital maceration Betadine was applied to all webspaces bilateral I also dispensed the patient various size Silipos toe spacers to place between the 1st and 2nd and 2nd and 3rd digits on the right foot where he is got a lot of rubbing and related discomfort.  Patient's athlete's foot fungal infection interdigital does look better today than it did previously although it is still needs to be monitored closely.  This note was created using M*Modal voice recognition software that occasionally misinterpreted phrases or words.    Additional paperwork requiring completion was executed for the patient's diabetic shoes patient does meet criteria for diabetic shoes with decreased circulation significant hammertoe and bunion deformity with diffuse callus and pre ulcerative sites noted.

## 2023-09-13 ENCOUNTER — TELEPHONE (OUTPATIENT)
Dept: PODIATRY | Facility: CLINIC | Age: 73
End: 2023-09-13
Payer: MEDICARE

## 2023-09-13 DIAGNOSIS — M20.12 VALGUS DEFORMITY OF BOTH GREAT TOES: ICD-10-CM

## 2023-09-13 DIAGNOSIS — M20.11 VALGUS DEFORMITY OF BOTH GREAT TOES: ICD-10-CM

## 2023-09-13 DIAGNOSIS — E11.9 COMPREHENSIVE DIABETIC FOOT EXAMINATION, TYPE 2 DM, ENCOUNTER FOR: ICD-10-CM

## 2023-09-13 DIAGNOSIS — E11.9 TYPE 2 DIABETES MELLITUS WITHOUT COMPLICATION, WITHOUT LONG-TERM CURRENT USE OF INSULIN: Primary | ICD-10-CM

## 2023-09-13 NOTE — TELEPHONE ENCOUNTER
[facility] requesting diabetic shoe prescription for patient. Please advise. Patient seen 9/5.

## 2023-09-19 ENCOUNTER — TELEPHONE (OUTPATIENT)
Dept: PODIATRY | Facility: CLINIC | Age: 73
End: 2023-09-19
Payer: MEDICARE

## 2023-09-19 NOTE — TELEPHONE ENCOUNTER
Nilsa at [facility] will be faxing some paperwork that medicare is requiring for patient's diabetic shoes.

## 2023-09-19 NOTE — TELEPHONE ENCOUNTER
----- Message from Tere Sen sent at 9/19/2023 11:01 AM CDT -----  Type: Needs Medical Advice  Who Called:  [facility]ils[facility] lyman/ [facility]    Best Call Back Number: [phone] ext [phone]  Additional Information: Regarding further requirements for [employer] limb and brace for pt's diabetic shoes  Please advise  Thank you

## 2024-01-23 ENCOUNTER — OFFICE VISIT (OUTPATIENT)
Dept: PODIATRY | Facility: CLINIC | Age: 74
End: 2024-01-23
Payer: MEDICARE

## 2024-01-23 VITALS
OXYGEN SATURATION: 96 % | BODY MASS INDEX: 32.73 KG/M2 | SYSTOLIC BLOOD PRESSURE: 167 MMHG | DIASTOLIC BLOOD PRESSURE: 75 MMHG | WEIGHT: 221 LBS | HEIGHT: 69 IN | HEART RATE: 75 BPM

## 2024-01-23 DIAGNOSIS — L60.0 INGROWN NAIL: Primary | ICD-10-CM

## 2024-01-23 DIAGNOSIS — L97.511 ULCER OF BOTH FEET, LIMITED TO BREAKDOWN OF SKIN: ICD-10-CM

## 2024-01-23 DIAGNOSIS — L97.521 ULCER OF BOTH FEET, LIMITED TO BREAKDOWN OF SKIN: ICD-10-CM

## 2024-01-23 DIAGNOSIS — M20.42 HAMMER TOES OF BOTH FEET: ICD-10-CM

## 2024-01-23 DIAGNOSIS — E11.9 TYPE 2 DIABETES MELLITUS WITHOUT COMPLICATION, WITHOUT LONG-TERM CURRENT USE OF INSULIN: ICD-10-CM

## 2024-01-23 DIAGNOSIS — M20.11 VALGUS DEFORMITY OF BOTH GREAT TOES: ICD-10-CM

## 2024-01-23 DIAGNOSIS — E11.9 COMPREHENSIVE DIABETIC FOOT EXAMINATION, TYPE 2 DM, ENCOUNTER FOR: ICD-10-CM

## 2024-01-23 DIAGNOSIS — M20.41 HAMMER TOES OF BOTH FEET: ICD-10-CM

## 2024-01-23 DIAGNOSIS — M20.12 VALGUS DEFORMITY OF BOTH GREAT TOES: ICD-10-CM

## 2024-01-23 PROCEDURE — 99999 PR PBB SHADOW E&M-EST. PATIENT-LVL V: CPT | Mod: PBBFAC,,, | Performed by: PODIATRIST

## 2024-01-23 PROCEDURE — 99213 OFFICE O/P EST LOW 20 MIN: CPT | Mod: S$PBB,,, | Performed by: PODIATRIST

## 2024-01-23 PROCEDURE — 99215 OFFICE O/P EST HI 40 MIN: CPT | Mod: PBBFAC,PN | Performed by: PODIATRIST

## 2024-01-23 RX ORDER — POLYETHYLENE GLYCOL 3350, SODIUM SULFATE ANHYDROUS, SODIUM BICARBONATE, SODIUM CHLORIDE, POTASSIUM CHLORIDE 236; 22.74; 6.74; 5.86; 2.97 G/4L; G/4L; G/4L; G/4L; G/4L
POWDER, FOR SOLUTION ORAL
COMMUNITY
Start: 2023-12-22

## 2024-01-23 RX ORDER — POLYETHYLENE GLYCOL 3350, SODIUM CHLORIDE, SODIUM BICARBONATE, POTASSIUM CHLORIDE 420; 11.2; 5.72; 1.48 G/4L; G/4L; G/4L; G/4L
4000 POWDER, FOR SOLUTION ORAL
COMMUNITY
Start: 2023-12-05

## 2024-01-23 RX ORDER — LACTULOSE 10 G/15ML
SOLUTION ORAL; RECTAL
COMMUNITY
Start: 2023-12-22

## 2024-01-23 RX ORDER — NYSTATIN 100000 U/G
CREAM TOPICAL
COMMUNITY
Start: 2023-12-12

## 2024-01-25 PROBLEM — L97.521 ULCER OF BOTH FEET, LIMITED TO BREAKDOWN OF SKIN: Status: ACTIVE | Noted: 2024-01-25

## 2024-01-25 PROBLEM — L97.511 ULCER OF BOTH FEET, LIMITED TO BREAKDOWN OF SKIN: Status: ACTIVE | Noted: 2024-01-25

## 2024-01-25 NOTE — PROGRESS NOTES
Subjective:       Patient ID: Gerald Tirado is a 73 y.o. male.    Chief Complaint: Diabetic Foot Exam    Patient presents today for a follow-up patient diabetic evaluation he is a resident of [facility].  Patient states he does have pain related to his bunions and he also has ingrown toenails he relates that he was diagnosed as a diabetic 2 years ago.  Past Medical History:   Diagnosis Date    Antisocial personality disorder     Cataract     Cerebrovascular disease     Complete loss of teeth     Constipation     Convulsions     Dementia     Diabetes mellitus, type 2     Falling     GERD (gastroesophageal reflux disease)     Gingival recession, generalized     Hyperlipidemia     Hypertension     Insomnia     Myopia, bilateral     Overactive bladder     Presbyopia     Schizophrenia     Unspecified astigmatism, unspecified eye      History reviewed. No pertinent surgical history.  History reviewed. No pertinent family history.  Social History     Socioeconomic History    Marital status: Single   Tobacco Use    Smoking status: Never    Smokeless tobacco: Never       Current Outpatient Medications   Medication Sig Dispense Refill    acetaminophen (TYLENOL) 325 MG tablet Take 325 mg by mouth every 6 (six) hours as needed for Pain.      amLODIPine (NORVASC) 10 MG tablet Take 10 mg by mouth once daily.      aspirin (ECOTRIN) 81 MG EC tablet Take 81 mg by mouth once daily.      atorvastatin (LIPITOR) 20 MG tablet Take 20 mg by mouth once daily.      bacitracin-neomycin-polymyxin b-hydrocortisone 1 % ointment Apply topically once daily.      bacitracin-polymyxin b (POLYSPORIN) ophthalmic ointment       calcium carbonate-vit D3-min 600 mg calcium- 400 unit Tab Take 1 tablet by mouth 2 (two) times daily.      cyclobenzaprine (FLEXERIL) 10 MG tablet Take 10 mg by mouth 2 (two) times a day.      diclofenac sodium (VOLTAREN) 1 % Gel Apply topically.      docusate sodium (COLACE) 100 MG capsule Take 100 mg by  "mouth 2 (two) times daily.      ferrous sulfate (FEOSOL) 325 mg (65 mg iron) Tab tablet Take 325 mg by mouth daily with breakfast.      furosemide (LASIX) 20 MG tablet Take 20 mg by mouth once daily.      GOLYTELY 236-22.74-6.74 -5.86 gram suspension Take by mouth.      insulin detemir U-100 (LEVEMIR) 100 unit/mL injection Inject 15 Units into the skin every evening.      lactulose (CHRONULAC) 10 gram/15 mL solution Take by mouth.      levETIRAcetam (KEPPRA) 750 MG Tab Take 500 mg by mouth 2 (two) times daily.      lisinopriL 10 MG tablet Take 10 mg by mouth once daily.      metFORMIN (GLUCOPHAGE) 500 MG tablet Take 500 mg by mouth 2 (two) times daily with meals.      nystatin (MYCOSTATIN) cream SMARTSI Topical Twice Daily      oxybutynin (DITROPAN) 5 MG Tab Take 5 mg by mouth 3 (three) times daily.      pantoprazole (PROTONIX) 20 MG tablet Take 20 mg by mouth once daily.      peg-electrolyte soln (NULYTELY WITH FLAVOR PACKS) 420 gram SolR Take 4,000 mLs by mouth.      polyethylene glycol (GLYCOLAX) 17 gram PwPk Take by mouth.      potassium chloride (MICRO-K) 10 MEQ CpSR Take by mouth 2 (two) times daily.       No current facility-administered medications for this visit.     Review of patient's allergies indicates:   Allergen Reactions    Baclofen        Review of Systems   Musculoskeletal:  Positive for arthralgias.   Skin:  Positive for color change.   Neurological:  Positive for numbness.   All other systems reviewed and are negative.      Objective:      Vitals:    24 1437   BP: (!) 167/75   Pulse: 75   SpO2: 96%   Weight: 100.2 kg (221 lb)   Height: 5' 9" (1.753 m)     Physical Exam  Vitals and nursing note reviewed. Exam conducted with a chaperone present.   Constitutional:       Appearance: Normal appearance.   Cardiovascular:      Pulses:           Dorsalis pedis pulses are 1+ on the right side and 1+ on the left side.        Posterior tibial pulses are 1+ on the right side and 1+ on the left side. "   Pulmonary:      Effort: Pulmonary effort is normal.   Musculoskeletal:         General: Swelling, tenderness and deformity present.      Right foot: Decreased range of motion. Deformity and bunion present.      Left foot: Decreased range of motion. Deformity and bunion present.   Feet:      Right foot:      Protective Sensation: 3 sites tested.  2 sites sensed.      Skin integrity: Erythema, callus and dry skin present.      Toenail Condition: Right toenails are abnormally thick, long and ingrown. Fungal disease present.     Left foot:      Protective Sensation: 3 sites tested.  2 sites sensed.      Skin integrity: Erythema, callus and dry skin present.      Toenail Condition: Left toenails are abnormally thick, long and ingrown. Fungal disease present.  Skin:     General: Skin is warm.      Capillary Refill: Capillary refill takes 2 to 3 seconds.      Findings: Erythema present.   Neurological:      General: No focal deficit present.      Mental Status: He is alert.   Psychiatric:         Mood and Affect: Mood normal.                                                        Assessment:       1. Ingrown nail    2. Type 2 diabetes mellitus without complication, without long-term current use of insulin    3. Comprehensive diabetic foot examination, type 2 DM, encounter for    4. Valgus deformity of both great toes    5. Hammer toes of both feet    6. Ulcer of both feet, limited to breakdown of skin        Plan:       Patient presents today for a follow-up patient diabetic evaluation he is a resident of [facility].  Patient states he does have pain related to his bunions and he also has ingrown toenails he relates that he was diagnosed as a diabetic 2 years ago.  Patient presented today with a chaperone from the nursing home.  A comprehensive new patient diabetic evaluation was performed patient does have some degree of loss of sensation although protective sensation is noted to be intact when tested  with a 5.07 monofilament.  Patient does have significant bunion and hammertoe deformities bilateral he does have pain related to these deformities and states he has to be very careful of the shoes that he wears.  Patient has significantly fungally infected nail several are ingrown and pinching the surrounding skin causing inflammation and early signs of ingrowing toenail.  Patient has no skin breaks no ulceration or signs of infection but there is inflammation related to ingrowing toenail I was able to trim and remove the ingrowing toenails patient did not require a nail avulsion at this time.  Comprehensive diabetic evaluation was performed today I advised the patient he needs to monitor his feet closely keep them well hydrated and he needs to keep the nails properly trimmed to prevent complications in the future.  Diabetic education was provided.  Patient did have interdigital maceration Betadine was applied to all webspaces bilateral I also dispensed the patient various size Silipos toe spacers to place between the 1st and 2nd and 2nd and 3rd digits on the right foot where he is got a lot of rubbing and related discomfort.  Patient's athlete's foot fungal infection interdigital does look better today than it did previously although it is still needs to be monitored closely.  Patient states he just received his diabetic shoes about 3-4 days ago in his recently started to wear them he states they are very comfortable these were prescribed for the patient on his last visit he does have several pre ulcerative areas that required non excisional debridement and need to be monitored closely.  This note was created using [email or web address] voice recognition software that occasionally misinterpreted phrases or words.

## 2024-04-23 ENCOUNTER — OFFICE VISIT (OUTPATIENT)
Dept: PODIATRY | Facility: CLINIC | Age: 74
End: 2024-04-23
Payer: MEDICARE

## 2024-04-23 VITALS
HEIGHT: 69 IN | HEART RATE: 75 BPM | BODY MASS INDEX: 32.72 KG/M2 | DIASTOLIC BLOOD PRESSURE: 83 MMHG | WEIGHT: 220.88 LBS | SYSTOLIC BLOOD PRESSURE: 159 MMHG

## 2024-04-23 DIAGNOSIS — L60.0 INGROWN NAIL: Primary | ICD-10-CM

## 2024-04-23 DIAGNOSIS — E11.9 COMPREHENSIVE DIABETIC FOOT EXAMINATION, TYPE 2 DM, ENCOUNTER FOR: ICD-10-CM

## 2024-04-23 DIAGNOSIS — E11.9 TYPE 2 DIABETES MELLITUS WITHOUT COMPLICATION, WITHOUT LONG-TERM CURRENT USE OF INSULIN: ICD-10-CM

## 2024-04-23 PROCEDURE — 99999 PR PBB SHADOW E&M-EST. PATIENT-LVL V: CPT | Mod: PBBFAC,,, | Performed by: PODIATRIST

## 2024-04-23 PROCEDURE — 99213 OFFICE O/P EST LOW 20 MIN: CPT | Mod: S$PBB,,, | Performed by: PODIATRIST

## 2024-04-23 PROCEDURE — 99215 OFFICE O/P EST HI 40 MIN: CPT | Mod: PBBFAC,PN | Performed by: PODIATRIST

## 2024-04-23 RX ORDER — KETOTIFEN FUMARATE 0.35 MG/ML
1 SOLUTION/ DROPS OPHTHALMIC 3 TIMES DAILY
COMMUNITY

## 2024-04-25 NOTE — PROGRESS NOTES
Subjective:       Patient ID: Gerald Tirado is a 73 y.o. male.    Chief Complaint: Diabetic Foot Exam and Nail Care    Patient presents today for a follow-up patient diabetic evaluation he is a resident of [facility].  Patient states he does have pain related to his bunions and he also has ingrown toenails he relates that he was diagnosed as a diabetic 2 years ago.  Past Medical History:   Diagnosis Date    Antisocial personality disorder     Cataract     Cerebrovascular disease     Complete loss of teeth     Constipation     Convulsions     Dementia     Diabetes mellitus, type 2     Falling     GERD (gastroesophageal reflux disease)     Gingival recession, generalized     Hyperlipidemia     Hypertension     Insomnia     Myopia, bilateral     Overactive bladder     Presbyopia     Schizophrenia     Unspecified astigmatism, unspecified eye      No past surgical history on file.  No family history on file.  Social History     Socioeconomic History    Marital status: Single   Tobacco Use    Smoking status: Never    Smokeless tobacco: Never       Current Outpatient Medications   Medication Sig Dispense Refill    acetaminophen (TYLENOL) 325 MG tablet Take 325 mg by mouth every 6 (six) hours as needed for Pain.      amLODIPine (NORVASC) 10 MG tablet Take 10 mg by mouth once daily.      aspirin (ECOTRIN) 81 MG EC tablet Take 81 mg by mouth once daily.      atorvastatin (LIPITOR) 20 MG tablet Take 20 mg by mouth once daily.      calcium carbonate-vit D3-min 600 mg calcium- 400 unit Tab Take 1 tablet by mouth 2 (two) times daily.      cyclobenzaprine (FLEXERIL) 10 MG tablet Take 10 mg by mouth 2 (two) times a day.      docusate sodium (COLACE) 100 MG capsule Take 100 mg by mouth 2 (two) times daily.      ferrous sulfate (FEOSOL) 325 mg (65 mg iron) Tab tablet Take 325 mg by mouth daily with breakfast.      furosemide (LASIX) 20 MG tablet Take 20 mg by mouth once daily.      insulin detemir U-100 (LEVEMIR)  100 unit/mL injection Inject 15 Units into the skin every evening.      ketotifen (ZADITOR) 0.025 % (0.035 %) ophthalmic solution 1 drop 3 (three) times daily.      levETIRAcetam (KEPPRA) 750 MG Tab Take 500 mg by mouth 2 (two) times daily.      lisinopriL 10 MG tablet Take 10 mg by mouth once daily.      metFORMIN (GLUCOPHAGE) 500 MG tablet Take 500 mg by mouth 2 (two) times daily with meals.      mineral oil-hydrophil petrolat (AQUAPHOR) Oint Apply topically once daily. Apply bilateral lower extremities daily      oxybutynin (DITROPAN) 5 MG Tab Take 5 mg by mouth 3 (three) times daily.      polyethylene glycol (GLYCOLAX) 17 gram PwPk Take by mouth.      potassium chloride (MICRO-K) 10 MEQ CpSR Take by mouth 2 (two) times daily.      bacitracin-neomycin-polymyxin b-hydrocortisone 1 % ointment Apply topically once daily. (Patient not taking: Reported on 2024)      bacitracin-polymyxin b (POLYSPORIN) ophthalmic ointment  (Patient not taking: Reported on 2024)      diclofenac sodium (VOLTAREN) 1 % Gel Apply topically. (Patient not taking: Reported on 2024)      GOLYTELY 236-22.74-6.74 -5.86 gram suspension Take by mouth. (Patient not taking: Reported on 2024)      lactulose (CHRONULAC) 10 gram/15 mL solution Take by mouth. (Patient not taking: Reported on 2024)      nystatin (MYCOSTATIN) cream SMARTSI Topical Twice Daily (Patient not taking: Reported on 2024)      pantoprazole (PROTONIX) 20 MG tablet Take 20 mg by mouth once daily. (Patient not taking: Reported on 2024)      peg-electrolyte soln (NULYTELY WITH FLAVOR PACKS) 420 gram SolR Take 4,000 mLs by mouth. (Patient not taking: Reported on 2024)       No current facility-administered medications for this visit.     Review of patient's allergies indicates:   Allergen Reactions    Baclofen        Review of Systems   Musculoskeletal:  Positive for arthralgias.   Skin:  Positive for color change.   Neurological:  Positive  "for numbness.   All other systems reviewed and are negative.      Objective:      Vitals:    04/23/24 1033   BP: (!) 159/83   BP Location: Left arm   Patient Position: Sitting   Pulse: 75   Weight: 100.2 kg (220 lb 14.4 oz)   Height: 5' 9" (1.753 m)     Physical Exam  Vitals and nursing note reviewed. Exam conducted with a chaperone present.   Constitutional:       Appearance: Normal appearance.   Cardiovascular:      Pulses:           Dorsalis pedis pulses are 1+ on the right side and 1+ on the left side.        Posterior tibial pulses are 1+ on the right side and 1+ on the left side.   Pulmonary:      Effort: Pulmonary effort is normal.   Musculoskeletal:         General: Swelling, tenderness and deformity present.      Right foot: Decreased range of motion. Deformity and bunion present.      Left foot: Decreased range of motion. Deformity and bunion present.   Feet:      Right foot:      Protective Sensation: 3 sites tested.  2 sites sensed.      Skin integrity: Erythema, callus and dry skin present.      Toenail Condition: Right toenails are abnormally thick, long and ingrown. Fungal disease present.     Left foot:      Protective Sensation: 3 sites tested.  2 sites sensed.      Skin integrity: Erythema, callus and dry skin present.      Toenail Condition: Left toenails are abnormally thick, long and ingrown. Fungal disease present.  Skin:     General: Skin is warm.      Capillary Refill: Capillary refill takes 2 to 3 seconds.      Findings: Erythema present.   Neurological:      General: No focal deficit present.      Mental Status: He is alert.   Psychiatric:         Mood and Affect: Mood normal.                                                        Assessment:       1. Ingrown nail    2. Type 2 diabetes mellitus without complication, without long-term current use of insulin    3. Comprehensive diabetic foot examination, type 2 DM, encounter for        Plan:       Patient presents today for a follow-up " patient diabetic evaluation he is a resident of [facility].  Patient states he does have pain related to his bunions and he also has ingrown toenails he relates that he was diagnosed as a diabetic 2 years ago.  Patient presented today with a chaperone from the nursing home.  A comprehensive new patient diabetic evaluation was performed patient does have some degree of loss of sensation although protective sensation is noted to be intact when tested with a 5.07 monofilament.  Patient does have significant bunion and hammertoe deformities bilateral he does have pain related to these deformities and states he has to be very careful of the shoes that he wears.  Patient has significantly fungally infected nail several are ingrown and pinching the surrounding skin causing inflammation and early signs of ingrowing toenail.  Patient has no skin breaks no ulceration or signs of infection but there is inflammation related to ingrowing toenail I was able to trim and remove the ingrowing toenails patient did not require a nail avulsion at this time.  Comprehensive diabetic evaluation was performed today I advised the patient he needs to monitor his feet closely keep them well hydrated and he needs to keep the nails properly trimmed to prevent complications in the future.  Diabetic education was provided.  Patient did have interdigital maceration Betadine was applied to all webspaces bilateral I also dispensed the patient various size Silipos toe spacers to place between the 1st and 2nd and 2nd and 3rd digits on the right foot where he is got a lot of rubbing and related discomfort.  Patient's athlete's foot fungal infection interdigital does look better today than it did previously although it is still needs to be monitored closely.  Patient states he just received his diabetic shoes about 3-4 days ago in his recently started to wear them he states they are very comfortable these were prescribed for the  patient on his last visit he does have several pre ulcerative areas that required non excisional debridement and need to be monitored closely.  This note was created using [employer] voice recognition software that occasionally misinterpreted phrases or words.

## 2024-07-23 ENCOUNTER — OFFICE VISIT (OUTPATIENT)
Dept: PODIATRY | Facility: CLINIC | Age: 74
End: 2024-07-23
Payer: MEDICARE

## 2024-07-23 VITALS
SYSTOLIC BLOOD PRESSURE: 122 MMHG | BODY MASS INDEX: 33.92 KG/M2 | WEIGHT: 229 LBS | HEART RATE: 83 BPM | HEIGHT: 69 IN | DIASTOLIC BLOOD PRESSURE: 64 MMHG

## 2024-07-23 DIAGNOSIS — E11.9 COMPREHENSIVE DIABETIC FOOT EXAMINATION, TYPE 2 DM, ENCOUNTER FOR: ICD-10-CM

## 2024-07-23 DIAGNOSIS — M20.11 VALGUS DEFORMITY OF BOTH GREAT TOES: ICD-10-CM

## 2024-07-23 DIAGNOSIS — L60.0 INGROWN NAIL: Primary | ICD-10-CM

## 2024-07-23 DIAGNOSIS — M20.41 HAMMER TOES OF BOTH FEET: ICD-10-CM

## 2024-07-23 DIAGNOSIS — E11.9 TYPE 2 DIABETES MELLITUS WITHOUT COMPLICATION, WITHOUT LONG-TERM CURRENT USE OF INSULIN: ICD-10-CM

## 2024-07-23 DIAGNOSIS — M20.12 VALGUS DEFORMITY OF BOTH GREAT TOES: ICD-10-CM

## 2024-07-23 DIAGNOSIS — M20.42 HAMMER TOES OF BOTH FEET: ICD-10-CM

## 2024-07-23 PROCEDURE — 3074F SYST BP LT 130 MM HG: CPT | Mod: CPTII,S$GLB,, | Performed by: PODIATRIST

## 2024-07-23 PROCEDURE — 1101F PT FALLS ASSESS-DOCD LE1/YR: CPT | Mod: CPTII,S$GLB,, | Performed by: PODIATRIST

## 2024-07-23 PROCEDURE — 3008F BODY MASS INDEX DOCD: CPT | Mod: CPTII,S$GLB,, | Performed by: PODIATRIST

## 2024-07-23 PROCEDURE — 1126F AMNT PAIN NOTED NONE PRSNT: CPT | Mod: CPTII,S$GLB,, | Performed by: PODIATRIST

## 2024-07-23 PROCEDURE — 99999 PR PBB SHADOW E&M-EST. PATIENT-LVL III: CPT | Mod: PBBFAC,,, | Performed by: PODIATRIST

## 2024-07-23 PROCEDURE — 99213 OFFICE O/P EST LOW 20 MIN: CPT | Mod: S$GLB,,, | Performed by: PODIATRIST

## 2024-07-23 PROCEDURE — 1159F MED LIST DOCD IN RCRD: CPT | Mod: CPTII,S$GLB,, | Performed by: PODIATRIST

## 2024-07-23 PROCEDURE — 4010F ACE/ARB THERAPY RXD/TAKEN: CPT | Mod: CPTII,S$GLB,, | Performed by: PODIATRIST

## 2024-07-23 PROCEDURE — 3078F DIAST BP <80 MM HG: CPT | Mod: CPTII,S$GLB,, | Performed by: PODIATRIST

## 2024-07-23 PROCEDURE — 1160F RVW MEDS BY RX/DR IN RCRD: CPT | Mod: CPTII,S$GLB,, | Performed by: PODIATRIST

## 2024-07-23 PROCEDURE — 3288F FALL RISK ASSESSMENT DOCD: CPT | Mod: CPTII,S$GLB,, | Performed by: PODIATRIST

## 2024-07-23 NOTE — PROGRESS NOTES
Subjective:       Patient ID: Gerald Tirado is a 73 y.o. male.    Chief Complaint: Diabetic Foot Exam and Nail Care    Patient presents today for a follow-up patient diabetic evaluation he is a resident of [facility].  Patient states he does have pain related to his bunions and he also has ingrown toenails he relates that he was diagnosed as a diabetic 2 years ago.  Past Medical History:   Diagnosis Date    Antisocial personality disorder     Cataract     Cerebrovascular disease     Complete loss of teeth     Constipation     Convulsions     Dementia     Diabetes mellitus, type 2     Falling     GERD (gastroesophageal reflux disease)     Gingival recession, generalized     Hyperlipidemia     Hypertension     Insomnia     Myopia, bilateral     Overactive bladder     Presbyopia     Schizophrenia     Unspecified astigmatism, unspecified eye      History reviewed. No pertinent surgical history.  No family history on file.  Social History     Socioeconomic History    Marital status: Single   Tobacco Use    Smoking status: Never    Smokeless tobacco: Never       Current Outpatient Medications   Medication Sig Dispense Refill    acetaminophen (TYLENOL) 325 MG tablet Take 325 mg by mouth every 6 (six) hours as needed for Pain.      amLODIPine (NORVASC) 10 MG tablet Take 10 mg by mouth once daily.      aspirin (ECOTRIN) 81 MG EC tablet Take 81 mg by mouth once daily.      atorvastatin (LIPITOR) 20 MG tablet Take 20 mg by mouth once daily.      bacitracin-neomycin-polymyxin b-hydrocortisone 1 % ointment Apply topically once daily. (Patient not taking: Reported on 4/23/2024)      bacitracin-polymyxin b (POLYSPORIN) ophthalmic ointment  (Patient not taking: Reported on 4/23/2024)      calcium carbonate-vit D3-min 600 mg calcium- 400 unit Tab Take 1 tablet by mouth 2 (two) times daily.      cyclobenzaprine (FLEXERIL) 10 MG tablet Take 10 mg by mouth 2 (two) times a day.      diclofenac sodium (VOLTAREN) 1 %  Gel Apply topically. (Patient not taking: Reported on 2024)      docusate sodium (COLACE) 100 MG capsule Take 100 mg by mouth 2 (two) times daily.      ferrous sulfate (FEOSOL) 325 mg (65 mg iron) Tab tablet Take 325 mg by mouth daily with breakfast.      furosemide (LASIX) 20 MG tablet Take 20 mg by mouth once daily.      GOLYTELY 236-22.74-6.74 -5.86 gram suspension Take by mouth. (Patient not taking: Reported on 2024)      insulin detemir U-100 (LEVEMIR) 100 unit/mL injection Inject 15 Units into the skin every evening.      ketotifen (ZADITOR) 0.025 % (0.035 %) ophthalmic solution 1 drop 3 (three) times daily.      lactulose (CHRONULAC) 10 gram/15 mL solution Take by mouth. (Patient not taking: Reported on 2024)      levETIRAcetam (KEPPRA) 750 MG Tab Take 500 mg by mouth 2 (two) times daily.      lisinopriL 10 MG tablet Take 10 mg by mouth once daily.      metFORMIN (GLUCOPHAGE) 500 MG tablet Take 500 mg by mouth 2 (two) times daily with meals.      mineral oil-hydrophil petrolat (AQUAPHOR) Oint Apply topically once daily. Apply bilateral lower extremities daily      nystatin (MYCOSTATIN) cream SMARTSI Topical Twice Daily (Patient not taking: Reported on 2024)      oxybutynin (DITROPAN) 5 MG Tab Take 5 mg by mouth 3 (three) times daily.      pantoprazole (PROTONIX) 20 MG tablet Take 20 mg by mouth once daily. (Patient not taking: Reported on 2024)      peg-electrolyte soln (NULYTELY WITH FLAVOR PACKS) 420 gram SolR Take 4,000 mLs by mouth. (Patient not taking: Reported on 2024)      polyethylene glycol (GLYCOLAX) 17 gram PwPk Take by mouth.      potassium chloride (MICRO-K) 10 MEQ CpSR Take by mouth 2 (two) times daily.       No current facility-administered medications for this visit.     Review of patient's allergies indicates:   Allergen Reactions    Baclofen        Review of Systems   Musculoskeletal:  Positive for arthralgias.   Skin:  Positive for color change.  "  Neurological:  Positive for numbness.   All other systems reviewed and are negative.      Objective:      Vitals:    07/23/24 1036   BP: 122/64   BP Location: Left arm   Patient Position: Sitting   Pulse: 83   Weight: 103.9 kg (229 lb)   Height: 5' 9" (1.753 m)     Physical Exam  Vitals and nursing note reviewed. Exam conducted with a chaperone present.   Constitutional:       Appearance: Normal appearance.   Cardiovascular:      Pulses:           Dorsalis pedis pulses are 1+ on the right side and 1+ on the left side.        Posterior tibial pulses are 1+ on the right side and 1+ on the left side.   Pulmonary:      Effort: Pulmonary effort is normal.   Musculoskeletal:         General: Swelling, tenderness and deformity present.      Right foot: Decreased range of motion. Deformity and bunion present.      Left foot: Decreased range of motion. Deformity and bunion present.   Feet:      Right foot:      Protective Sensation: 3 sites tested.  2 sites sensed.      Skin integrity: Erythema, callus and dry skin present.      Toenail Condition: Right toenails are abnormally thick, long and ingrown. Fungal disease present.     Left foot:      Protective Sensation: 3 sites tested.  2 sites sensed.      Skin integrity: Erythema, callus and dry skin present.      Toenail Condition: Left toenails are abnormally thick, long and ingrown. Fungal disease present.  Skin:     General: Skin is warm.      Capillary Refill: Capillary refill takes 2 to 3 seconds.      Findings: Erythema present.   Neurological:      General: No focal deficit present.      Mental Status: He is alert.   Psychiatric:         Mood and Affect: Mood normal.                                                                      Assessment:       1. Ingrown nail    2. Type 2 diabetes mellitus without complication, without long-term current use of insulin    3. Comprehensive diabetic foot examination, type 2 DM, encounter for    4. Hammer toes of both feet    5. " Valgus deformity of both great toes        Plan:       Patient presents today for a follow-up patient diabetic evaluation he is a resident of [facility].  Patient states he does have pain related to his bunions and he also has ingrown toenails he relates that he was diagnosed as a diabetic 2 years ago.  Patient presented today with a chaperone from the nursing home.  A comprehensive new patient diabetic evaluation was performed patient does have some degree of loss of sensation although protective sensation is noted to be intact when tested with a 5.07 monofilament.  Patient does have significant bunion and hammertoe deformities bilateral he does have pain related to these deformities and states he has to be very careful of the shoes that he wears.  Patient has significantly fungally infected nail several are ingrown and pinching the surrounding skin causing inflammation and early signs of ingrowing toenail.  Patient has no skin breaks no ulceration or signs of infection but there is inflammation related to ingrowing toenail I was able to trim and remove the ingrowing toenails patient did not require a nail avulsion at this time.  Comprehensive diabetic evaluation was performed today I advised the patient he needs to monitor his feet closely keep them well hydrated and he needs to keep the nails properly trimmed to prevent complications in the future.  Diabetic education was provided.  Patient did have interdigital maceration Betadine was applied to all webspaces bilateral I also dispensed the patient various size Silipos toe spacers to place between the 1st and 2nd and 2nd and 3rd digits on the right foot where he is got a lot of rubbing and related discomfort.  Patient's athlete's foot fungal infection interdigital does look better today than it did previously although it is still needs to be monitored closely.  Patient states he just received his diabetic shoes about 3-4 days ago in his  recently started to wear them he states they are very comfortable these were prescribed for the patient on his last visit he does have several pre ulcerative areas that required non excisional debridement and need to be monitored closely.  This note was created using [employer] voice recognition software that occasionally misinterpreted phrases or words.